# Patient Record
Sex: FEMALE | Race: WHITE
[De-identification: names, ages, dates, MRNs, and addresses within clinical notes are randomized per-mention and may not be internally consistent; named-entity substitution may affect disease eponyms.]

---

## 2019-08-08 ENCOUNTER — HOSPITAL ENCOUNTER (EMERGENCY)
Dept: HOSPITAL 10 - E/R | Age: 54
Discharge: HOME | End: 2019-08-08
Payer: SELF-PAY

## 2019-08-08 VITALS
HEIGHT: 63 IN | WEIGHT: 205.47 LBS | HEIGHT: 63 IN | WEIGHT: 205.47 LBS | BODY MASS INDEX: 36.41 KG/M2 | BODY MASS INDEX: 36.41 KG/M2

## 2019-08-08 VITALS — RESPIRATION RATE: 16 BRPM | HEART RATE: 82 BPM | SYSTOLIC BLOOD PRESSURE: 133 MMHG | DIASTOLIC BLOOD PRESSURE: 76 MMHG

## 2019-08-08 DIAGNOSIS — F17.210: ICD-10-CM

## 2019-08-08 DIAGNOSIS — R10.32: Primary | ICD-10-CM

## 2019-08-08 DIAGNOSIS — E11.9: ICD-10-CM

## 2019-08-08 DIAGNOSIS — I10: ICD-10-CM

## 2019-08-08 PROCEDURE — 74176 CT ABD & PELVIS W/O CONTRAST: CPT

## 2019-08-08 PROCEDURE — 81001 URINALYSIS AUTO W/SCOPE: CPT

## 2019-08-08 PROCEDURE — 96375 TX/PRO/DX INJ NEW DRUG ADDON: CPT

## 2019-08-08 PROCEDURE — 80053 COMPREHEN METABOLIC PANEL: CPT

## 2019-08-08 PROCEDURE — 36415 COLL VENOUS BLD VENIPUNCTURE: CPT

## 2019-08-08 PROCEDURE — 96374 THER/PROPH/DIAG INJ IV PUSH: CPT

## 2019-08-08 PROCEDURE — 99285 EMERGENCY DEPT VISIT HI MDM: CPT

## 2019-08-08 PROCEDURE — 83690 ASSAY OF LIPASE: CPT

## 2019-08-08 PROCEDURE — 81003 URINALYSIS AUTO W/O SCOPE: CPT

## 2019-08-08 PROCEDURE — 85025 COMPLETE CBC W/AUTO DIFF WBC: CPT

## 2019-08-08 NOTE — ERD
ER Documentation


Chief Complaint


Chief Complaint





constant L sided AP started AM. hx hysterectomy no NVD





HPI


Patient is a 54-year-old female with history of hypertension and diabetes who 


presents with abdominal pain.  She complains of left lower quadrant abdominal 


pain and high blood pressure.  She also went to have her "diabetes checked".  


She said the symptoms started this morning.  The symptoms have been constant.  


She has had no treatment as of yet.  She denies fevers, nausea, or vomiting.  


Upon review of old medical records this is the patient's first visit to the 


emergency department.  She does not currently have a primary doctor.  She did 


have previous hysterectomy done.





ROS


All systems reviewed and are negative except as per history of present illness.





Medications


Home Meds


Active Scripts


Duloxetine Hcl* (Duloxetine Hcl*) 60 Mg Capsule.dr, 60 MG PO DAILY, #7 CAP


   Prov:RACHNA KELLY MD         8/8/19


Quetiapine Fumarate* (Seroquel*) 300 Mg Tablet, 300 MG PO HS, #7 TAB


   Prov:RACHNA KELLY MD         8/8/19


Ondansetron (Ondansetron Odt) 4 Mg Tab.rapdis, 4 MG PO Q6H PRN for NAUSEA AND/OR


VOMITING, #10 TAB


   Prov:RACHNA KELLY MD         8/8/19


Ibuprofen* (Motrin*) 600 Mg Tab, 600 MG PO Q6H PRN for PAIN AND OR ELEVATED 


TEMP, #30 TAB


   Prov:RACHNA KELLY MD         8/8/19


Reported Medications


Duloxetine Hcl* (Duloxetine Hcl*) 60 Mg Capsule.dr, 60 MG PO DAILY, #30 CAP


   8/8/19


Quetiapine Fumarate* (Seroquel*) 300 Mg Tablet, 150 MG PO HS, TAB


   8/8/19





Allergies


Allergies:  


Coded Allergies:  


     Penicillins (Verified  Allergy, Unknown, 8/8/19)





PMhx/Soc


History of Surgery:  No


Anesthesia Reaction:  No


Hx Neurological Disorder:  No


Hx Respiratory Disorders:  No


Hx Cardiac Disorders:  No


Hx Psychiatric Problems:  No


Hx Miscellaneous Medical Probl:  Yes (PSORIASIS. )


Hx Alcohol Use:  No


Hx Substance Use:  No


Hx Tobacco Use:  Yes


Smoking Status:  Current every day smoker





FmHx


Family History:  diabetes





Physical Exam


Vitals





Vital Signs


  Date      Temp  Pulse  Resp  B/P (MAP)   Pulse Ox  O2          O2 Flow    FiO2


Time                                                 Delivery    Rate


    8/8/19  98.3     82    16      133/76        98  Room Air


     19:24                           (95)


    8/8/19  98.1     88    16      137/77        98  Room Air


     17:57                           (97)


    8/8/19  98.1     98    16     165/134        98  Room Air


     17:23                          (144)


    8/8/19  98.1    100    16      179/90        98


     16:23                          (119)





Physical Exam


Const:   Moderate distress secondary to pain


Head:   Atraumatic 


Eyes:    Normal Conjunctiva


ENT:    Normal External Ears, Nose and Mouth.


Neck:               Full range of motion. No meningismus.


Resp:   Clear to auscultation bilaterally


Cardio:   Regular rate and rhythm, no murmurs


Abd:    Soft, left lower quadrant tenderness to palpation without rebound or 


guarding


Skin:   No petechiae or rashes


Back:   No midline or flank tenderness


Ext:    No cyanosis, or edema


Neur:   Awake and alert


Psych:    Normal Mood and Affect


Result Diagram:  


8/8/19 1712                                                                     


          8/8/19 1712





Results 24 hrs





Laboratory Tests


      Test
                                   8/8/19
16:15    8/8/19
17:12


      Urine Color                          YELLOW


      Urine Clarity
                       SLIGHTLY
CLOUDY  



      Urine pH                                        5.0


      Urine Specific Gravity                        1.020


      Urine Ketones                        TRACE mg/dL


      Urine Nitrite                        NEGATIVE mg/dL


      Urine Bilirubin                      NEGATIVE mg/dL


      Urine Urobilinogen                   NEGATIVE mg/dL


      Urine Leukocyte Esterase
            NEGATIVE
Rupert/ul  



      Urine Microscopic RBC                        3 /HPF


      Urine Microscopic WBC                        2 /HPF


      Urine Squamous Epithelial
Cells      MODERATE /HPF 
  



      Urine Hemoglobin                     NEGATIVE mg/dL


      Urine Glucose                        NEGATIVE mg/dL


      Urine Total Protein                  NEGATIVE mg/dl


      White Blood Count                                       6.7 10^3/ul


      Red Blood Count                                        5.01 10^6/ul


      Hemoglobin                                                13.8 g/dl


      Hematocrit                                                   43.4 %


      Mean Corpuscular Volume                                     86.6 fl


      Mean Corpuscular Hemoglobin                                 27.5 pg


      Mean Corpuscular Hemoglobin
Concent  
                   31.8 g/dl 



      Red Cell Distribution Width                                  14.3 %


      Platelet Count                                          224 10^3/UL


      Mean Platelet Volume                                        11.1 fl


      Immature Granulocytes %                                     0.300 %


      Neutrophils %                                                72.7 %


      Lymphocytes %                                                18.3 %


      Monocytes %                                                   5.7 %


      Eosinophils %                                                 2.7 %


      Basophils %                                                   0.3 %


      Nucleated Red Blood Cells %                             0.0 /100WBC


      Immature Granulocytes #                               0.020 10^3/ul


      Neutrophils #                                           4.9 10^3/ul


      Lymphocytes #                                           1.2 10^3/ul


      Monocytes #                                             0.4 10^3/ul


      Eosinophils #                                           0.2 10^3/ul


      Basophils #                                             0.0 10^3/ul


      Nucleated Red Blood Cells #                             0.0 10^3/ul


      Sodium Level                                             141 mmol/L


      Potassium Level                                          3.7 mmol/L


      Chloride Level                                           107 mmol/L


      Carbon Dioxide Level                                      28 mmol/L


      Anion Gap                                                         6


      Blood Urea Nitrogen                                        11 mg/dl


      Creatinine                                               0.52 mg/dl


      Est Glomerular Filtrat Rate
mL/min   
                > 60 mL/min 



      Glucose Level                                             155 mg/dl


      Calcium Level                                             9.6 mg/dl


      Total Bilirubin                                           0.4 mg/dl


      Direct Bilirubin                                         0.00 mg/dl


      Indirect Bilirubin                                        0.4 mg/dl


      Aspartate Amino Transf
(AST/SGOT)    
                     27 IU/L 



      Alanine Aminotransferase
(ALT/SGPT)  
                     25 IU/L 



      Alkaline Phosphatase                                       102 IU/L


      Total Protein                                              7.7 g/dl


      Albumin                                                    4.1 g/dl


      Globulin                                                  3.60 g/dl


      Albumin/Globulin Ratio                                         1.13


      Lipase                                                       41 U/L





Current Medications


 Medications
   Dose
          Sig/Son
       Start Time
   Status  Last


 (Trade)       Ordered        Route
 PRN     Stop Time              Admin
Dose


                              Reason                                Admin


 Morphine       4 mg           ONCE  STAT
    8/8/19        DC       



Sulfate
                      IV
            17:05
 8/8/19


(morphine)                                   17:06


 Ondansetron    4 mg           ONCE  STAT
    8/8/19        DC            8/8/19


HCl
  (Zofran                 IV
            17:05
 8/8/19                17:17



Inj)                                         17:06


 Ketorolac
     30 mg          ONCE  STAT
    8/8/19        DC            8/8/19


Tromethamine
                 IV
            17:46
 8/8/19                17:55



 (Toradol)                                   17:47








Procedures/MDM


CT abdomen pelvis is read by radiology.





Patient is a 54-year-old female presents with abdominal pain.  The patient was 


found to have left lower quadrant abdominal pain but no signs of surgical 


process on CT scan.  Laboratory studies are basically normal.  I do not believe 


patient requires further work-up or admission in the hospital at this time.  I 


doubt diverticulitis, appendicitis, cholecystitis, pancreatitis.  The patient 


will need close follow-up with the primary doctor at the local clinics within 24


to 48 hours if she does not currently have a primary doctor.





Smoking Cessation Therapy:  Pt. was lectured for greater than  3 minutes on the 


health risks of continued smoking and the benefits of cessation.





Departure


Diagnosis:  


   Primary Impression:  


   Abdominal pain


   Abdominal location:  left lower quadrant  Qualified Codes:  R10.32 - Left 


   lower quadrant pain


Condition:  Fair


Patient Instructions:  Abdominal Pain


Referrals:  


[facility]


YOU HAVE RECEIVED A MEDICAL SCREENING EXAM AND THE RESULTS INDICATE THAT YOU DO 


NOT HAVE A CONDITION THAT REQUIRES URGENT TREATMENT IN THE EMERGENCY DEPARTMENT.





FURTHER EVALUATION AND TREATMENT OF YOUR CONDITION CAN WAIT UNTIL YOU ARE SEEN 


IN YOUR DOCTORS OFFICE WITHIN THE NEXT 1-2 DAYS. IT IS YOUR RESPONSIBILITY TO 


MAKE AN APPOINTMENT FOR FOLOW-UP CARE.





IF YOU HAVE A PRIMARY DOCTOR


--you should call your primary doctor and schedule an appointment





IF YOU DO NOT HAVE A PRIMARY DOCTOR YOU CAN CALL OUR PHYSICIAN REFERRAL HOTLINE 


AT


 [phone] 





IF YOU CAN NOT AFFORD TO SEE A PHYSICIAN YOU CAN CHOSE FROM THE FOLLOWING 


[facility] [phone] [address] [facility]. [facility] [phone] [address] [facility]. [facility] [phone] [address] [facility]. [facility] [phone] [address] [facility]. [facility] [phone] [address] [facility]. [facility]. [phone]


[address]





Additional Instructions:  


Call your primary care doctor TOMORROW for an appointment during the next 1-2 


days.See the doctor sooner or return here if your condition worsens before your 


appointment time.











RACHNA KELLY MD                 Aug 8, 2019 19:31